# Patient Record
Sex: MALE
[De-identification: names, ages, dates, MRNs, and addresses within clinical notes are randomized per-mention and may not be internally consistent; named-entity substitution may affect disease eponyms.]

---

## 2017-10-06 ENCOUNTER — CHARTING TRANS (OUTPATIENT)
Dept: OTHER | Age: 27
End: 2017-10-06

## 2018-02-21 ENCOUNTER — CHARTING TRANS (OUTPATIENT)
Dept: OTHER | Age: 28
End: 2018-02-21

## 2018-11-01 VITALS
TEMPERATURE: 98.7 F | OXYGEN SATURATION: 95 % | DIASTOLIC BLOOD PRESSURE: 78 MMHG | RESPIRATION RATE: 20 BRPM | SYSTOLIC BLOOD PRESSURE: 120 MMHG | HEART RATE: 104 BPM | BODY MASS INDEX: 35.07 KG/M2 | HEIGHT: 70 IN | WEIGHT: 245 LBS

## 2018-11-02 VITALS
RESPIRATION RATE: 20 BRPM | TEMPERATURE: 99.4 F | DIASTOLIC BLOOD PRESSURE: 72 MMHG | SYSTOLIC BLOOD PRESSURE: 108 MMHG | OXYGEN SATURATION: 98 % | HEIGHT: 70 IN | HEART RATE: 108 BPM | WEIGHT: 250 LBS | BODY MASS INDEX: 35.79 KG/M2

## 2019-10-03 ENCOUNTER — WALK IN (OUTPATIENT)
Dept: URGENT CARE | Age: 29
End: 2019-10-03

## 2019-10-03 VITALS
TEMPERATURE: 99.4 F | DIASTOLIC BLOOD PRESSURE: 80 MMHG | SYSTOLIC BLOOD PRESSURE: 118 MMHG | HEIGHT: 70 IN | BODY MASS INDEX: 36.51 KG/M2 | HEART RATE: 84 BPM | WEIGHT: 255 LBS | RESPIRATION RATE: 18 BRPM

## 2019-10-03 DIAGNOSIS — B34.9 PHARYNGITIS WITH VIRAL SYNDROME: Primary | ICD-10-CM

## 2019-10-03 DIAGNOSIS — J02.9 PHARYNGITIS WITH VIRAL SYNDROME: Primary | ICD-10-CM

## 2019-10-03 PROCEDURE — 99213 OFFICE O/P EST LOW 20 MIN: CPT | Performed by: NURSE PRACTITIONER

## 2019-10-03 RX ORDER — ALBUTEROL SULFATE 90 UG/1
AEROSOL, METERED RESPIRATORY (INHALATION)
COMMUNITY
End: 2022-09-30 | Stop reason: SDUPTHER

## 2019-10-03 RX ORDER — PAROXETINE HYDROCHLORIDE 40 MG/1
TABLET, FILM COATED ORAL
COMMUNITY

## 2019-10-03 RX ORDER — ALBUTEROL SULFATE 90 UG/1
2 AEROSOL, METERED RESPIRATORY (INHALATION)
COMMUNITY
Start: 2017-12-17 | End: 2022-09-30 | Stop reason: SDUPTHER

## 2019-10-03 RX ORDER — ALPRAZOLAM 0.5 MG/1
0.5 TABLET ORAL
COMMUNITY

## 2019-10-03 ASSESSMENT — ENCOUNTER SYMPTOMS
FATIGUE: 1
RESPIRATORY NEGATIVE: 1
SORE THROAT: 1
GASTROINTESTINAL NEGATIVE: 1
EYES NEGATIVE: 1
ACTIVITY CHANGE: 1

## 2020-01-30 ENCOUNTER — WALK IN (OUTPATIENT)
Dept: URGENT CARE | Age: 30
End: 2020-01-30

## 2020-01-30 VITALS
WEIGHT: 254.52 LBS | DIASTOLIC BLOOD PRESSURE: 78 MMHG | SYSTOLIC BLOOD PRESSURE: 120 MMHG | HEART RATE: 78 BPM | BODY MASS INDEX: 36.44 KG/M2 | RESPIRATION RATE: 16 BRPM | TEMPERATURE: 98.8 F | HEIGHT: 70 IN

## 2020-01-30 DIAGNOSIS — J10.1 INFLUENZA B: Primary | ICD-10-CM

## 2020-01-30 DIAGNOSIS — J02.9 PHARYNGITIS, UNSPECIFIED ETIOLOGY: ICD-10-CM

## 2020-01-30 LAB
FLUAV AG UPPER RESP QL IA.RAPID: NEGATIVE
FLUBV AG UPPER RESP QL IA.RAPID: POSITIVE
INTERNAL PROCEDURAL CONTROLS ACCEPTABLE: YES
S PYO AG THROAT QL IA.RAPID: NEGATIVE

## 2020-01-30 PROCEDURE — 87804 INFLUENZA ASSAY W/OPTIC: CPT | Performed by: NURSE PRACTITIONER

## 2020-01-30 PROCEDURE — 87880 STREP A ASSAY W/OPTIC: CPT | Performed by: NURSE PRACTITIONER

## 2020-01-30 PROCEDURE — 99214 OFFICE O/P EST MOD 30 MIN: CPT | Performed by: NURSE PRACTITIONER

## 2020-01-30 RX ORDER — OSELTAMIVIR PHOSPHATE 75 MG/1
75 CAPSULE ORAL 2 TIMES DAILY
Qty: 10 CAPSULE | Refills: 0 | Status: SHIPPED | OUTPATIENT
Start: 2020-01-30 | End: 2020-02-04

## 2020-01-30 RX ORDER — PAROXETINE 30 MG/1
TABLET, FILM COATED ORAL
Refills: 2 | COMMUNITY
Start: 2020-01-14 | End: 2022-09-30 | Stop reason: SDUPTHER

## 2020-01-30 RX ORDER — ALBUTEROL SULFATE 90 UG/1
2 AEROSOL, METERED RESPIRATORY (INHALATION)
COMMUNITY
Start: 2019-11-12

## 2020-01-30 RX ORDER — PAROXETINE HYDROCHLORIDE 20 MG/1
TABLET, FILM COATED ORAL
Refills: 2 | COMMUNITY
Start: 2019-11-04 | End: 2022-09-30 | Stop reason: SDUPTHER

## 2020-01-30 ASSESSMENT — ENCOUNTER SYMPTOMS
EYE PAIN: 0
SORE THROAT: 1
BACK PAIN: 0
PHOTOPHOBIA: 0
SINUS PRESSURE: 1
EYE REDNESS: 0
ABDOMINAL PAIN: 0
TROUBLE SWALLOWING: 0
SHORTNESS OF BREATH: 0
CHOKING: 0
APNEA: 0
CHILLS: 1
EYE ITCHING: 0
COLOR CHANGE: 0
LIGHT-HEADEDNESS: 0
FEVER: 1
CHEST TIGHTNESS: 0
WHEEZING: 0
NAUSEA: 0
FACIAL SWELLING: 0
SINUS PAIN: 1
EYE DISCHARGE: 0
FATIGUE: 1
DIARRHEA: 0
DIZZINESS: 0
WEAKNESS: 0
VOMITING: 0
RHINORRHEA: 1
HEADACHES: 1
COUGH: 1
STRIDOR: 0
APPETITE CHANGE: 0

## 2022-09-30 ENCOUNTER — WALK IN (OUTPATIENT)
Dept: URGENT CARE | Age: 32
End: 2022-09-30

## 2022-09-30 VITALS
DIASTOLIC BLOOD PRESSURE: 80 MMHG | BODY MASS INDEX: 32.93 KG/M2 | HEIGHT: 70 IN | RESPIRATION RATE: 20 BRPM | WEIGHT: 230 LBS | OXYGEN SATURATION: 100 % | HEART RATE: 90 BPM | SYSTOLIC BLOOD PRESSURE: 122 MMHG | TEMPERATURE: 98.6 F

## 2022-09-30 DIAGNOSIS — J06.9 UPPER RESPIRATORY TRACT INFECTION, UNSPECIFIED TYPE: Primary | ICD-10-CM

## 2022-09-30 PROBLEM — R10.9 FLANK PAIN: Status: ACTIVE | Noted: 2022-06-02

## 2022-09-30 PROBLEM — F41.9 ANXIETY: Status: ACTIVE | Noted: 2022-09-30

## 2022-09-30 PROBLEM — J45.909 ASTHMA: Status: ACTIVE | Noted: 2022-06-02

## 2022-09-30 LAB
FLUAV AG UPPER RESP QL IA.RAPID: NEGATIVE
FLUBV AG UPPER RESP QL IA.RAPID: NEGATIVE
INTERNAL PROCEDURAL CONTROLS ACCEPTABLE: YES
S PYO AG THROAT QL IA.RAPID: NEGATIVE
SARS-COV+SARS-COV-2 AG RESP QL IA.RAPID: NOT DETECTED
TEST LOT EXPIRATION DATE: NORMAL
TEST LOT EXPIRATION DATE: NORMAL
TEST LOT NUMBER: NORMAL
TEST LOT NUMBER: NORMAL

## 2022-09-30 PROCEDURE — 87880 STREP A ASSAY W/OPTIC: CPT | Performed by: NURSE PRACTITIONER

## 2022-09-30 PROCEDURE — 99213 OFFICE O/P EST LOW 20 MIN: CPT | Performed by: NURSE PRACTITIONER

## 2022-09-30 PROCEDURE — U0003 INFECTIOUS AGENT DETECTION BY NUCLEIC ACID (DNA OR RNA); SEVERE ACUTE RESPIRATORY SYNDROME CORONAVIRUS 2 (SARS-COV-2) (CORONAVIRUS DISEASE [COVID-19]), AMPLIFIED PROBE TECHNIQUE, MAKING USE OF HIGH THROUGHPUT TECHNOLOGIES AS DESCRIBED BY CMS-2020-01-R: HCPCS | Performed by: INTERNAL MEDICINE

## 2022-09-30 PROCEDURE — U0005 INFEC AGEN DETEC AMPLI PROBE: HCPCS | Performed by: INTERNAL MEDICINE

## 2022-09-30 PROCEDURE — 87428 SARSCOV & INF VIR A&B AG IA: CPT | Performed by: NURSE PRACTITIONER

## 2022-09-30 RX ORDER — FLUTICASONE PROPIONATE 50 MCG
SPRAY, SUSPENSION (ML) NASAL
COMMUNITY
Start: 2022-09-30

## 2022-10-01 LAB
SARS-COV-2 RNA RESP QL NAA+PROBE: NOT DETECTED
SERVICE CMNT-IMP: NORMAL
SERVICE CMNT-IMP: NORMAL

## 2022-12-15 ENCOUNTER — WALK IN (OUTPATIENT)
Dept: URGENT CARE | Age: 32
End: 2022-12-15

## 2022-12-15 VITALS
WEIGHT: 230 LBS | RESPIRATION RATE: 18 BRPM | OXYGEN SATURATION: 100 % | DIASTOLIC BLOOD PRESSURE: 78 MMHG | HEIGHT: 70 IN | TEMPERATURE: 97 F | HEART RATE: 77 BPM | BODY MASS INDEX: 32.93 KG/M2 | SYSTOLIC BLOOD PRESSURE: 110 MMHG

## 2022-12-15 DIAGNOSIS — J11.1 INFLUENZA-LIKE ILLNESS: Primary | ICD-10-CM

## 2022-12-15 DIAGNOSIS — H66.92 LEFT OTITIS MEDIA, UNSPECIFIED OTITIS MEDIA TYPE: ICD-10-CM

## 2022-12-15 PROCEDURE — 99213 OFFICE O/P EST LOW 20 MIN: CPT | Performed by: NURSE PRACTITIONER

## 2022-12-15 PROCEDURE — 87428 SARSCOV & INF VIR A&B AG IA: CPT | Performed by: NURSE PRACTITIONER

## 2022-12-15 PROCEDURE — 87880 STREP A ASSAY W/OPTIC: CPT | Performed by: NURSE PRACTITIONER

## 2022-12-15 RX ORDER — AMOXICILLIN 875 MG/1
875 TABLET, COATED ORAL 2 TIMES DAILY
Qty: 14 TABLET | Refills: 0 | Status: SHIPPED | OUTPATIENT
Start: 2022-12-15 | End: 2022-12-22

## 2022-12-15 ASSESSMENT — ENCOUNTER SYMPTOMS
VOMITING: 0
EYE REDNESS: 0
FEVER: 1
CHEST TIGHTNESS: 0
SHORTNESS OF BREATH: 0
PHOTOPHOBIA: 0
EYE ITCHING: 0
WEAKNESS: 0
DIARRHEA: 0
ABDOMINAL PAIN: 0
NAUSEA: 0
RHINORRHEA: 0
DIAPHORESIS: 0
TROUBLE SWALLOWING: 0
SORE THROAT: 1
APPETITE CHANGE: 1
FATIGUE: 0
SINUS PRESSURE: 0
WHEEZING: 1
SINUS PAIN: 0
DIZZINESS: 0
COLOR CHANGE: 0
ACTIVITY CHANGE: 0
CHILLS: 1
COUGH: 1
EYE PAIN: 0
LIGHT-HEADEDNESS: 0
HEADACHES: 1
EYE DISCHARGE: 0

## 2023-04-26 ENCOUNTER — OFFICE VISIT (OUTPATIENT)
Dept: FAMILY MEDICINE CLINIC | Facility: CLINIC | Age: 33
End: 2023-04-26

## 2023-04-26 VITALS
DIASTOLIC BLOOD PRESSURE: 83 MMHG | HEART RATE: 86 BPM | HEIGHT: 70 IN | BODY MASS INDEX: 34.5 KG/M2 | SYSTOLIC BLOOD PRESSURE: 121 MMHG | WEIGHT: 241 LBS

## 2023-04-26 DIAGNOSIS — N20.0 RIGHT NEPHROLITHIASIS: ICD-10-CM

## 2023-04-26 DIAGNOSIS — S39.011A STRAIN OF MUSCLE OF RIGHT GROIN REGION: ICD-10-CM

## 2023-04-26 DIAGNOSIS — R93.5 ABNORMAL ABDOMINAL CT SCAN: Primary | ICD-10-CM

## 2023-04-26 PROCEDURE — 99203 OFFICE O/P NEW LOW 30 MIN: CPT | Performed by: NURSE PRACTITIONER

## 2023-04-26 PROCEDURE — 3008F BODY MASS INDEX DOCD: CPT | Performed by: NURSE PRACTITIONER

## 2023-04-26 PROCEDURE — 3074F SYST BP LT 130 MM HG: CPT | Performed by: NURSE PRACTITIONER

## 2023-04-26 PROCEDURE — 3079F DIAST BP 80-89 MM HG: CPT | Performed by: NURSE PRACTITIONER

## 2023-04-26 RX ORDER — ALPRAZOLAM 0.5 MG/1
1 TABLET ORAL NIGHTLY PRN
COMMUNITY
Start: 2021-12-07

## 2023-04-26 RX ORDER — PAROXETINE HYDROCHLORIDE 40 MG/1
40 TABLET, FILM COATED ORAL EVERY MORNING
COMMUNITY
Start: 2021-12-07

## 2023-04-26 RX ORDER — ALBUTEROL SULFATE 90 UG/1
2 AEROSOL, METERED RESPIRATORY (INHALATION) EVERY 6 HOURS PRN
COMMUNITY
Start: 2022-06-02

## 2023-04-26 NOTE — ASSESSMENT & PLAN NOTE
Will do mri enterography abd and pelvis   Discussed symptoms of bowel obstruction and pt denies all   Discussed w pt red flag symptoms that if they occur, pt should immediately go to ER. Pt states understanding.

## 2023-04-26 NOTE — ASSESSMENT & PLAN NOTE
Ice 20 min 4-6 times per day  Do not use heat on injury  Do not apply ice directly on skin, make certain a thin cloth is between skin and ice pack    May use ibuprofen 600 mg po three times per day as needed w food  Please call if symptoms worsen or are not resolving. 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

## 2023-06-23 ENCOUNTER — HOSPITAL ENCOUNTER (OUTPATIENT)
Dept: MRI IMAGING | Facility: HOSPITAL | Age: 33
Discharge: HOME | End: 2023-06-23
Attending: NURSE PRACTITIONER
Payer: COMMERCIAL

## 2023-06-23 DIAGNOSIS — R93.5 ABNORMAL ABDOMINAL CT SCAN: ICD-10-CM

## 2023-06-23 PROCEDURE — 74183 MRI ABD W/O CNTR FLWD CNTR: CPT | Performed by: NURSE PRACTITIONER

## 2023-06-23 PROCEDURE — 72197 MRI PELVIS W/O & W/DYE: CPT | Performed by: NURSE PRACTITIONER

## 2023-06-23 PROCEDURE — A9575 INJ GADOTERATE MEGLUMI 0.1ML: HCPCS | Performed by: NURSE PRACTITIONER

## 2023-06-23 RX ORDER — GADOTERATE MEGLUMINE 376.9 MG/ML
20 INJECTION INTRAVENOUS
Status: COMPLETED | OUTPATIENT
Start: 2023-06-23 | End: 2023-06-23

## 2023-06-23 RX ADMIN — GADOTERATE MEGLUMINE 20 ML: 376.9 INJECTION INTRAVENOUS at 10:58:00

## 2023-09-05 ENCOUNTER — V-VISIT (OUTPATIENT)
Dept: URGENT CARE | Age: 33
End: 2023-09-05

## 2023-09-05 VITALS
RESPIRATION RATE: 18 BRPM | DIASTOLIC BLOOD PRESSURE: 86 MMHG | HEIGHT: 70 IN | BODY MASS INDEX: 33.64 KG/M2 | TEMPERATURE: 97.5 F | SYSTOLIC BLOOD PRESSURE: 124 MMHG | OXYGEN SATURATION: 98 % | HEART RATE: 77 BPM | WEIGHT: 235 LBS

## 2023-09-05 DIAGNOSIS — H65.00 ACUTE SEROUS OTITIS MEDIA, RECURRENCE NOT SPECIFIED, UNSPECIFIED LATERALITY: Primary | ICD-10-CM

## 2023-09-05 PROCEDURE — 99213 OFFICE O/P EST LOW 20 MIN: CPT | Performed by: NURSE PRACTITIONER

## 2023-09-05 RX ORDER — AMOXICILLIN AND CLAVULANATE POTASSIUM 875; 125 MG/1; MG/1
1 TABLET, FILM COATED ORAL 2 TIMES DAILY
Qty: 14 TABLET | Refills: 0 | Status: SHIPPED | OUTPATIENT
Start: 2023-09-05 | End: 2023-09-12

## 2023-09-05 ASSESSMENT — ENCOUNTER SYMPTOMS
DIZZINESS: 0
SINUS PRESSURE: 0
SORE THROAT: 0
FEVER: 0
DIAPHORESIS: 0
RESPIRATORY NEGATIVE: 1

## 2023-09-05 ASSESSMENT — PAIN SCALES - GENERAL: PAINLEVEL: 6

## 2024-11-17 ENCOUNTER — HOSPITAL ENCOUNTER (INPATIENT)
Facility: HOSPITAL | Age: 34
LOS: 1 days | Discharge: HOME | End: 2024-11-18
Attending: EMERGENCY MEDICINE | Admitting: INTERNAL MEDICINE
Payer: COMMERCIAL

## 2024-11-17 ENCOUNTER — HOSPITAL ENCOUNTER (OUTPATIENT)
Age: 34
Discharge: ANOTHER HOSPITAL | End: 2024-11-17
Payer: COMMERCIAL

## 2024-11-17 VITALS
DIASTOLIC BLOOD PRESSURE: 68 MMHG | OXYGEN SATURATION: 99 % | SYSTOLIC BLOOD PRESSURE: 126 MMHG | RESPIRATION RATE: 18 BRPM | HEART RATE: 72 BPM | TEMPERATURE: 98 F

## 2024-11-17 DIAGNOSIS — L03.116 CELLULITIS OF LEFT LOWER EXTREMITY: Primary | ICD-10-CM

## 2024-11-17 DIAGNOSIS — T14.8XXA INFECTED PUNCTURE WOUND: Primary | ICD-10-CM

## 2024-11-17 DIAGNOSIS — L08.9 INFECTED PUNCTURE WOUND: Primary | ICD-10-CM

## 2024-11-17 DIAGNOSIS — L03.119 CELLULITIS OF FOOT: ICD-10-CM

## 2024-11-17 DIAGNOSIS — L03.129 LYMPHANGITIS, ACUTE, LOWER LEG: ICD-10-CM

## 2024-11-17 LAB
ALBUMIN SERPL-MCNC: 4.3 G/DL (ref 3.2–4.8)
ALBUMIN/GLOB SERPL: 1.3 {RATIO} (ref 1–2)
ALP LIVER SERPL-CCNC: 66 U/L
ALT SERPL-CCNC: 20 U/L
ANION GAP SERPL CALC-SCNC: 3 MMOL/L (ref 0–18)
AST SERPL-CCNC: 14 U/L (ref ?–34)
BASOPHILS # BLD AUTO: 0.04 X10(3) UL (ref 0–0.2)
BASOPHILS NFR BLD AUTO: 0.5 %
BILIRUB SERPL-MCNC: 0.6 MG/DL (ref 0.3–1.2)
BUN BLD-MCNC: 19 MG/DL (ref 9–23)
CALCIUM BLD-MCNC: 9.6 MG/DL (ref 8.7–10.4)
CHLORIDE SERPL-SCNC: 106 MMOL/L (ref 98–112)
CO2 SERPL-SCNC: 27 MMOL/L (ref 21–32)
CREAT BLD-MCNC: 1.08 MG/DL
EGFRCR SERPLBLD CKD-EPI 2021: 92 ML/MIN/1.73M2 (ref 60–?)
EOSINOPHIL # BLD AUTO: 0.26 X10(3) UL (ref 0–0.7)
EOSINOPHIL NFR BLD AUTO: 3.3 %
ERYTHROCYTE [DISTWIDTH] IN BLOOD BY AUTOMATED COUNT: 12.7 %
GLOBULIN PLAS-MCNC: 3.2 G/DL (ref 2–3.5)
GLUCOSE BLD-MCNC: 93 MG/DL (ref 70–99)
HCT VFR BLD AUTO: 44.5 %
HGB BLD-MCNC: 15.3 G/DL
IMM GRANULOCYTES # BLD AUTO: 0.01 X10(3) UL (ref 0–1)
IMM GRANULOCYTES NFR BLD: 0.1 %
LYMPHOCYTES # BLD AUTO: 2.19 X10(3) UL (ref 1–4)
LYMPHOCYTES NFR BLD AUTO: 27.4 %
MCH RBC QN AUTO: 28.1 PG (ref 26–34)
MCHC RBC AUTO-ENTMCNC: 34.4 G/DL (ref 31–37)
MCV RBC AUTO: 81.7 FL
MONOCYTES # BLD AUTO: 0.75 X10(3) UL (ref 0.1–1)
MONOCYTES NFR BLD AUTO: 9.4 %
NEUTROPHILS # BLD AUTO: 4.75 X10 (3) UL (ref 1.5–7.7)
NEUTROPHILS # BLD AUTO: 4.75 X10(3) UL (ref 1.5–7.7)
NEUTROPHILS NFR BLD AUTO: 59.3 %
OSMOLALITY SERPL CALC.SUM OF ELEC: 284 MOSM/KG (ref 275–295)
PLATELET # BLD AUTO: 282 10(3)UL (ref 150–450)
POTASSIUM SERPL-SCNC: 3.9 MMOL/L (ref 3.5–5.1)
PROT SERPL-MCNC: 7.5 G/DL (ref 5.7–8.2)
RBC # BLD AUTO: 5.45 X10(6)UL
SODIUM SERPL-SCNC: 136 MMOL/L (ref 136–145)
WBC # BLD AUTO: 8 X10(3) UL (ref 4–11)

## 2024-11-17 PROCEDURE — 99223 1ST HOSP IP/OBS HIGH 75: CPT | Performed by: INTERNAL MEDICINE

## 2024-11-17 RX ORDER — BISACODYL 10 MG
10 SUPPOSITORY, RECTAL RECTAL
Status: DISCONTINUED | OUTPATIENT
Start: 2024-11-17 | End: 2024-11-18

## 2024-11-17 RX ORDER — ALPRAZOLAM 0.5 MG
0.5 TABLET ORAL NIGHTLY PRN
Status: DISCONTINUED | OUTPATIENT
Start: 2024-11-17 | End: 2024-11-18

## 2024-11-17 RX ORDER — SODIUM PHOSPHATE, DIBASIC AND SODIUM PHOSPHATE, MONOBASIC 7; 19 G/230ML; G/230ML
1 ENEMA RECTAL ONCE AS NEEDED
Status: DISCONTINUED | OUTPATIENT
Start: 2024-11-17 | End: 2024-11-18

## 2024-11-17 RX ORDER — ACETAMINOPHEN 500 MG
500 TABLET ORAL EVERY 4 HOURS PRN
Status: DISCONTINUED | OUTPATIENT
Start: 2024-11-17 | End: 2024-11-18

## 2024-11-17 RX ORDER — POLYETHYLENE GLYCOL 3350 17 G/17G
17 POWDER, FOR SOLUTION ORAL DAILY PRN
Status: DISCONTINUED | OUTPATIENT
Start: 2024-11-17 | End: 2024-11-18

## 2024-11-17 RX ORDER — KETOROLAC TROMETHAMINE 30 MG/ML
15 INJECTION, SOLUTION INTRAMUSCULAR; INTRAVENOUS EVERY 6 HOURS PRN
Status: DISCONTINUED | OUTPATIENT
Start: 2024-11-17 | End: 2024-11-18

## 2024-11-17 RX ORDER — ALBUTEROL SULFATE 90 UG/1
2 INHALANT RESPIRATORY (INHALATION) EVERY 6 HOURS PRN
Status: DISCONTINUED | OUTPATIENT
Start: 2024-11-17 | End: 2024-11-18

## 2024-11-17 RX ORDER — PROCHLORPERAZINE EDISYLATE 5 MG/ML
5 INJECTION INTRAMUSCULAR; INTRAVENOUS EVERY 8 HOURS PRN
Status: DISCONTINUED | OUTPATIENT
Start: 2024-11-17 | End: 2024-11-18

## 2024-11-17 RX ORDER — PAROXETINE 20 MG/1
40 TABLET, FILM COATED ORAL EVERY MORNING
Status: DISCONTINUED | OUTPATIENT
Start: 2024-11-18 | End: 2024-11-18

## 2024-11-17 RX ORDER — HEPARIN SODIUM 5000 [USP'U]/ML
5000 INJECTION, SOLUTION INTRAVENOUS; SUBCUTANEOUS EVERY 8 HOURS SCHEDULED
Status: DISCONTINUED | OUTPATIENT
Start: 2024-11-17 | End: 2024-11-18

## 2024-11-17 RX ORDER — ONDANSETRON 2 MG/ML
4 INJECTION INTRAMUSCULAR; INTRAVENOUS EVERY 6 HOURS PRN
Status: DISCONTINUED | OUTPATIENT
Start: 2024-11-17 | End: 2024-11-18

## 2024-11-17 RX ORDER — SENNOSIDES 8.6 MG
17.2 TABLET ORAL NIGHTLY PRN
Status: DISCONTINUED | OUTPATIENT
Start: 2024-11-17 | End: 2024-11-18

## 2024-11-17 RX ORDER — KETOROLAC TROMETHAMINE 30 MG/ML
30 INJECTION, SOLUTION INTRAMUSCULAR; INTRAVENOUS EVERY 6 HOURS PRN
Status: DISCONTINUED | OUTPATIENT
Start: 2024-11-17 | End: 2024-11-18

## 2024-11-17 NOTE — ED INITIAL ASSESSMENT (HPI)
Pt states he got a nail in his left foot 2 days ago. Pt states having increased pain and swelling. Went to [facility] to get his TDAP shot and then went to . Per call in, sent for IV ABX.

## 2024-11-17 NOTE — PLAN OF CARE
NURSING ADMISSION NOTE      Patient admitted via Cart from ED.   Oriented to room.  Safety precautions initiated.  Bed in low position.  Call light in reach. Denies pain at this time.

## 2024-11-17 NOTE — ED QUICK NOTES
Orders for admission, patient is aware of plan and ready to go upstairs. Any questions, please call ED RN Yessi ESTRADA at extension [phone].     Patient Covid vaccination status: Fully vaccinated     COVID Test Ordered in ED: None    COVID Suspicion at Admission: N/A    Running Infusions:      Mental Status/LOC at time of transport: A&Ox4    Other pertinent information:   CIWA score: N/A   NIH score:  N/A

## 2024-11-17 NOTE — ED PROVIDER NOTES
Patient Seen in: [facility] Emergency Department      History     Chief Complaint   Patient presents with    Other     Stated Complaint: from urgent care for IV antibiotics    Subjective:   HPI      33-year-old male with no significant past medical history presents with left foot pain and swelling after he stepped on a nail 2 days ago.  Patient states the nail went through his work boot and punctured the bottom of his foot.  He states he went to [facility] got a tetanus shot but was not seen by provider.  He states pain has been gradually worsening over the last couple of days and went to immediate care and he was sent to ED for further evaluation after they noted red streaking on the dorsum of the foot.  He denies fever or chills.  Denies nausea or vomiting.    Objective:     History reviewed. No pertinent past medical history.           Past Surgical History:   Procedure Laterality Date    Tonsillectomy      2018                Social History     Socioeconomic History    Marital status:    Tobacco Use    Smoking status: Never    Smokeless tobacco: Never   Vaping Use    Vaping status: Never Used   Substance and Sexual Activity    Alcohol use: Yes     Comment: Occ.    Drug use: Never                  Physical Exam     ED Triage Vitals [11/17/24 1406]   /72   Pulse 74   Resp 17   Temp 98 °F (36.7 °C)   Temp src Temporal   SpO2 99 %   O2 Device None (Room air)       Current Vitals:   Vital Signs  BP: 118/54  Pulse: 56  Resp: 17  Temp: 98 °F (36.7 °C)  Temp src: Temporal  MAP (mmHg): 70    Oxygen Therapy  SpO2: 100 %  O2 Device: None (Room air)        Physical Exam  Vitals and nursing note reviewed.   Constitutional:       General: He is not in acute distress.     Appearance: He is well-developed. He is not ill-appearing.   HENT:      Head: Normocephalic and atraumatic.      Mouth/Throat:      Mouth: Mucous membranes are moist.   Eyes:      General: No scleral icterus.     Extraocular Movements:  Extraocular movements intact.   Musculoskeletal:      Cervical back: Neck supple.      Comments: Puncture wound to the sole of the left foot with tenderness, no significant surrounding erythema or drainage  Dorsum of left foot with tenderness, edema and erythema extending from forefoot streaking up to the ankle     Skin:     General: Skin is warm and dry.      Capillary Refill: Capillary refill takes less than 2 seconds.   Neurological:      Mental Status: He is alert and oriented to person, place, and time.      GCS: GCS eye subscore is 4. GCS verbal subscore is 5. GCS motor subscore is 6.   Psychiatric:         Mood and Affect: Mood normal.         Behavior: Behavior normal.            ED Course     Labs Reviewed   COMP METABOLIC PANEL (14) - Normal   CBC WITH DIFFERENTIAL WITH PLATELET   BLOOD CULTURE   BLOOD CULTURE                   MDM      33-year-old male with no significant past medical history presents with left foot pain and swelling after he stepped on a nail 2 days ago.      Differential includes but is not limited to infected puncture wound, cellulitis, lymphangitis    Labs unremarkable with normal WBC.    Discussed with ID Dr. London.  Recommends admission for IV antibiotics/Zosyn which will provide pseudomonal and anaerobic coverage.  Discussed with hospitalist Dr. Beasley.        Admission disposition: 11/17/2024  3:23 PM           Medical Decision Making  Amount and/or Complexity of Data Reviewed  Labs: ordered. Decision-making details documented in ED Course.  Discussion of management or test interpretation with external provider(s): ID, hospitalist    Risk  Decision regarding hospitalization.        Disposition and Plan     Clinical Impression:  1. Infected puncture wound    2. Cellulitis of foot         Disposition:  Admit  11/17/2024  3:23 pm    Follow-up:  No follow-up provider specified.        Medications Prescribed:  Current Discharge Medication List              Supplementary  Documentation:         Hospital Problems       Present on Admission  Date Reviewed: 4/26/2023            ICD-10-CM Noted POA    * (Principal) Infected puncture wound T14.8XXA, L08.9 11/17/2024 Unknown

## 2024-11-17 NOTE — H&P
[facility]     Maxx Ray Patient Status:  Emergency    12/15/1990 MRN [id number]   Location [facility] EMERGENCY DEPARTMENT Attending Gladys Daniel MD   Hosp Day # 0 PCP None Pcp     Chief Complaint: nail to L foot    Subjective:    History of Present Illness:     Maxx Ray is a 33 year old male presents with L foot wound. Pt stepped on nail with his L foot 2 days ago. He got tetnus shot at [facility] as well. He has continued redness, swelling and pain to his L foot. He denies any F/C. No CP or SOB. No N/V/D/C or abdpain.     History/Other:    Past Medical History:  History reviewed. No pertinent past medical history.  Past Surgical History:   Past Surgical History:   Procedure Laterality Date    Tonsillectomy      2018      Family History:   No family history on file.  Social History:    reports that he has never smoked. He has never used smokeless tobacco. He reports current alcohol use. He reports that he does not use drugs.     Allergies: Allergies[1]    Medications:  Medications Ordered Prior to Encounter[2]    Review of Systems:   A comprehensive review of systems was completed.    Pertinent positives and negatives noted in the HPI.    Objective:   Physical Exam:    /54   Pulse 56   Temp 98 °F (36.7 °C) (Temporal)   Resp 17   Ht 177.8 cm (5' 10\")   Wt 235 lb (106.6 kg)   SpO2 100%   BMI 33.72 kg/m²   General: No acute distress, Alert  Respiratory: No rhonchi, no wheezes  Cardiovascular: S1, S2. Regular rate and rhythm  Abdomen: Soft, Non-tender, non-distended, positive bowel sounds  Neuro: No new focal deficits  Extremities: puncture wound to L foot with tenderness and erythema streaking to foot/ankle    Results:    Labs:      Labs Last 24 Hours:    Recent Labs   Lab 24  1429   RBC 5.45   HGB 15.3   HCT 44.5   MCV 81.7   MCH 28.1   MCHC 34.4   RDW 12.7   NEPRELIM 4.75   WBC 8.0   .0       Recent Labs   Lab 24  1429   GLU 93   BUN  19   CREATSERUM 1.08   EGFRCR 92   CA 9.6   ALB 4.3      K 3.9      CO2 27.0   ALKPHO 66   AST 14   ALT 20   BILT 0.6   TP 7.5       No results found for: \"PT\", \"INR\"    No results for input(s): \"TROP\", \"TROPHS\", \"CK\" in the last 168 hours.    No results for input(s): \"TROP\", \"PBNP\" in the last 168 hours.    No results for input(s): \"PCT\" in the last 168 hours.    Imaging: Imaging data reviewed in Epic.    Assessment & Plan:      #Infected Puncture Wound  #L foot cellulitis  Continue empiric abx, hopefully can transition to PO tmrw  Follow cultures  ID to evaluate        Plan of care discussed with patient, ED Physician     Juan Beasley MD    Supplementary Documentation:     The 21st Century Cures Act makes medical notes like these available to patients in the interest of transparency. Please be advised this is a medical document. Medical documents are intended to carry relevant information, facts as evident, and the clinical opinion of the practitioner. The medical note is intended as peer to peer communication and may appear blunt or direct. It is written in medical language and may contain abbreviations or verbiage that are unfamiliar.                                       [1] No Known Allergies  [2]   No current facility-administered medications on file prior to encounter.     Current Outpatient Medications on File Prior to Encounter   Medication Sig Dispense Refill    albuterol 108 (90 Base) MCG/ACT Inhalation Aero Soln Inhale 2 puffs into the lungs every 6 (six) hours as needed.      ALPRAZolam 0.5 MG Oral Tab Take 1 tablet (0.5 mg total) by mouth nightly as needed.      PARoxetine HCl 40 MG Oral Tab Take 1 tablet (40 mg total) by mouth every morning.

## 2024-11-17 NOTE — ED PROVIDER NOTES
Chief Complaint   Patient presents with    Foot Pain       HPI:     Maxx Ray is a 33 year old male who presents today with a chief complaint of left foot pain.  2 days ago, was working, wearing working boots, stepped on nail.  Went to [facility] for tetanus shot, was not evaluated.  Has not been on any antibiotics for this.  Denies fever, sweats, chills.    PFSH      PFSH asessment screens reviewed and agree.  Nurses notes reviewed I agree with documentation.    No family history on file.  Family history reviewed with patient/caregiver and is not pertinent to presenting problem.  Social History     Socioeconomic History    Marital status:      Spouse name: Not on file    Number of children: Not on file    Years of education: Not on file    Highest education level: Not on file   Occupational History    Not on file   Tobacco Use    Smoking status: Never    Smokeless tobacco: Never   Vaping Use    Vaping status: Never Used   Substance and Sexual Activity    Alcohol use: Yes     Comment: Occ.    Drug use: Never    Sexual activity: Not on file   Other Topics Concern    Not on file   Social History Narrative    Not on file     Social Drivers of Health     Financial Resource Strain: Not on file   Food Insecurity: Not on file   Transportation Needs: Not on file   Physical Activity: Not on file   Stress: Not on file   Social Connections: Not on file   Housing Stability: Not on file         ROS:   Positive for stated complaint: nail through boot  All other systems reviewed and negative except as noted above.  Constitutional and Vital Signs Reviewed.      Physical Exam:     Rash:    There is swelling, erythema, tenderness surrounding pinpoint puncture wound to left plantar portion of the foot.  To the dorsal foot there is erythema, and proximal red streaking up the ankle.    Physical Exam:  /68   Pulse 72   Temp 98.1 °F (36.7 °C) (Temporal)   Resp 18   SpO2 99%   GENERAL: well developed, well nourished,  well hydrated, no distress  SKIN: good skin turgor, see above description for rash    MDM/Assessment/Plan:   Orders for this encounter:    No orders of the defined types were placed in this encounter.      Labs performed this visit:  No results found for this or any previous visit (from the past 10 hours).    MDM:  Medical Decision Making  Differentials considered: Cellulitis versus lymphangitis versus retained foreign body versus other    Patient needs further evaluation in the ER, has signs of lymphangitis on exam today.  Patient will go to [facility].  Patient agreeable.    Case discussed with Dr. Jose Resendez-Linden          Diagnosis:    ICD-10-CM    1. Cellulitis of left lower extremity  L03.116       2. Lymphangitis, acute, lower leg  L03.129           All results reviewed and discussed with patient.  See AVS for detailed discharge instructions for your condition today.    Follow Up with:  No follow-up provider specified.

## 2024-11-17 NOTE — ED INITIAL ASSESSMENT (HPI)
Patient reports stepping on nail to left foot two days ago. Patient reports going to [facility] same day for tetanus shot. Patient concerns for continued redness, swelling, and pain to left foot. CMS intact. Streaking noted to left foot. Denies fever.

## 2024-11-18 ENCOUNTER — APPOINTMENT (OUTPATIENT)
Dept: GENERAL RADIOLOGY | Facility: HOSPITAL | Age: 34
End: 2024-11-18
Attending: INTERNAL MEDICINE
Payer: COMMERCIAL

## 2024-11-18 VITALS
BODY MASS INDEX: 33.64 KG/M2 | RESPIRATION RATE: 20 BRPM | OXYGEN SATURATION: 98 % | HEART RATE: 56 BPM | SYSTOLIC BLOOD PRESSURE: 104 MMHG | WEIGHT: 235 LBS | DIASTOLIC BLOOD PRESSURE: 55 MMHG | HEIGHT: 70 IN | TEMPERATURE: 99 F

## 2024-11-18 LAB
BASOPHILS # BLD AUTO: 0.06 X10(3) UL (ref 0–0.2)
BASOPHILS NFR BLD AUTO: 0.8 %
EOSINOPHIL # BLD AUTO: 0.34 X10(3) UL (ref 0–0.7)
EOSINOPHIL NFR BLD AUTO: 4.5 %
ERYTHROCYTE [DISTWIDTH] IN BLOOD BY AUTOMATED COUNT: 12.6 %
HCT VFR BLD AUTO: 43.3 %
HGB BLD-MCNC: 15 G/DL
IMM GRANULOCYTES # BLD AUTO: 0.02 X10(3) UL (ref 0–1)
IMM GRANULOCYTES NFR BLD: 0.3 %
LYMPHOCYTES # BLD AUTO: 3.45 X10(3) UL (ref 1–4)
LYMPHOCYTES NFR BLD AUTO: 46.1 %
MCH RBC QN AUTO: 28.2 PG (ref 26–34)
MCHC RBC AUTO-ENTMCNC: 34.6 G/DL (ref 31–37)
MCV RBC AUTO: 81.4 FL
MONOCYTES # BLD AUTO: 0.71 X10(3) UL (ref 0.1–1)
MONOCYTES NFR BLD AUTO: 9.5 %
NEUTROPHILS # BLD AUTO: 2.9 X10 (3) UL (ref 1.5–7.7)
NEUTROPHILS # BLD AUTO: 2.9 X10(3) UL (ref 1.5–7.7)
NEUTROPHILS NFR BLD AUTO: 38.8 %
PLATELET # BLD AUTO: 263 10(3)UL (ref 150–450)
RBC # BLD AUTO: 5.32 X10(6)UL
WBC # BLD AUTO: 7.5 X10(3) UL (ref 4–11)

## 2024-11-18 PROCEDURE — 73630 X-RAY EXAM OF FOOT: CPT | Performed by: INTERNAL MEDICINE

## 2024-11-18 PROCEDURE — 99239 HOSP IP/OBS DSCHRG MGMT >30: CPT | Performed by: INTERNAL MEDICINE

## 2024-11-18 RX ORDER — LEVOFLOXACIN 750 MG/1
750 TABLET, FILM COATED ORAL DAILY
Qty: 7 TABLET | Refills: 0 | Status: SHIPPED | OUTPATIENT
Start: 2024-11-18 | End: 2024-11-25

## 2024-11-18 NOTE — CONSULTS
INFECTIOUS DISEASE CONSULTATION    Maxx Ray Patient Status:  Inpatient    12/15/1990 MRN [id number]   [facility] 3SW-A Attending Juan Beasley MD   Hosp Day # 1 PCP JANNETH SHERIFF MD       Requested by Dr. Beasley    Reason for Consultation:  Left foot cellulitis    History of Present Illness:  Maxx Ray is a a(n) 33 year old male had stepped on a nail that was sticking through the bottom of an outdoor fence through his shoe on \"Thursday\" .  He came to ED on  with redness at the site and was stared on zosyn overnight.  It is significantly improved. Denies pain or swelling.       History:  History reviewed. No pertinent past medical history.  Past Surgical History:   Procedure Laterality Date    Tonsillectomy      2018     No family history on file.   reports that he has never smoked. He has never used smokeless tobacco. He reports current alcohol use. He reports that he does not use drugs.      Allergies:  Allergies[1]    Medications:    Current Facility-Administered Medications:     ALPRAZolam (Xanax) tab 0.5 mg, 0.5 mg, Oral, Nightly PRN    PARoxetine (Paxil) tab 40 mg, 40 mg, Oral, QAM    albuterol (Ventolin HFA) 108 (90 Base) MCG/ACT inhaler 2 puff, 2 puff, Inhalation, Q6H PRN    piperacillin-tazobactam (Zosyn) 3.375 g in dextrose 5% 100 mL IVPB-ADDV, 3.375 g, Intravenous, Q8H    heparin (Porcine) 5000 UNIT/ML injection 5,000 Units, 5,000 Units, Subcutaneous, Q8H ELLIE    acetaminophen (Tylenol Extra Strength) tab 500 mg, 500 mg, Oral, Q4H PRN    ondansetron (Zofran) 4 MG/2ML injection 4 mg, 4 mg, Intravenous, Q6H PRN    prochlorperazine (Compazine) 10 MG/2ML injection 5 mg, 5 mg, Intravenous, Q8H PRN    polyethylene glycol (PEG 3350) (Miralax) 17 g oral packet 17 g, 17 g, Oral, Daily PRN    sennosides (Senokot) tab 17.2 mg, 17.2 mg, Oral, Nightly PRN    bisacodyl (Dulcolax) 10 MG rectal suppository 10 mg, 10 mg,  Rectal, Daily PRN    fleet enema (Fleet) rectal enema 133 mL, 1 enema, Rectal, Once PRN    ketorolac (Toradol) 30 MG/ML injection 15 mg, 15 mg, Intravenous, Q6H PRN **OR** ketorolac (Toradol) 30 MG/ML injection 30 mg, 30 mg, Intravenous, Q6H PRN  Medications Ordered Prior to Encounter[2]    Review of Systems:    A comprehensive 10 point review of systems was completed.  Pertinent positives and negatives noted in the the HPI.      Physical Exam:    General: No acute distress. Alert and oriented x 3.  Vital signs: Temp:  [97.6 °F (36.4 °C)-98.6 °F (37 °C)] 98.6 °F (37 °C)  Pulse:  [56-74] 56  Resp:  [17-20] 20  BP: (104-126)/(54-72) 104/55  SpO2:  [97 %-100 %] 98 %  Body mass index is 33.72 kg/m².  HEENT: Moist mucous membranes. Extraocular muscles are intact.  Neck: No swelling, no masses  Respiratory: Non labored, symmetric exursion  Cardiovascular: No irregularities in rhythm  Abdomen: Soft, nontender, nondistended.    Musculoskeletal: Full range of motion of all extremities.  No swelling noted.  Joints: no effusions. Puncture site noted, no drainage  Skin: No lesions. No erythema, no open wounds    Laboratory Data:  Laboratory data reviewed      Recent Labs   Lab 11/18/24  0428   RBC 5.32   HGB 15.0   HCT 43.3   MCV 81.4   MCH 28.2   MCHC 34.6   RDW 12.6   NEPRELIM 2.90   WBC 7.5   .0       Recent Labs   Lab 11/17/24  1429   GLU 93   BUN 19   CREATSERUM 1.08   CA 9.6   ALB 4.3      K 3.9      CO2 27.0   ALKPHO 66   AST 14   ALT 20   BILT 0.6   TP 7.5            Established Problem list:  Patient Active Problem List   Diagnosis    Abnormal abdominal CT scan    Right nephrolithiasis    Strain of muscle of right groin region    Infected puncture wound    Cellulitis of foot       ASSESSMENT/PLAN:  1. Nail puncture wound left foot, 2nd MTP area on 11/14  Admitted 11/17 with cellulitis, much improved on zosyn  -can check xray for retained FB  Transition to PO levaquin for discharge          Srikanth CINTRON  MD Mina, MD [facility]  [phone]         [1] No Known Allergies  [2]   No current facility-administered medications on file prior to encounter.     Current Outpatient Medications on File Prior to Encounter   Medication Sig Dispense Refill    albuterol 108 (90 Base) MCG/ACT Inhalation Aero Soln Inhale 2 puffs into the lungs every 6 (six) hours as needed.      ALPRAZolam 0.5 MG Oral Tab Take 1 tablet (0.5 mg total) by mouth nightly as needed.      PARoxetine HCl 40 MG Oral Tab Take 1 tablet (40 mg total) by mouth every morning.

## 2024-11-18 NOTE — PLAN OF CARE
NURSING DISCHARGE NOTE    Discharged Home via Ambulatory.  Accompanied by Spouse  Belongings Taken by patient/family.    AVS printed and discussed, IV removed, Rx for PO ABX E-scribed, reminded to  medicine at pharmacy. Pt ready to discharge home.

## 2024-11-18 NOTE — DISCHARGE SUMMARY
[facility]  DISCHARGE SUMMARY     Maxx Ray Patient Status:  Inpatient    12/15/1990 MRN [id number]   Location [facility] 3SW-A Attending Juan Beasley MD   Hosp Day # 1 PCP JANNETH SHERIFF MD     Date of Admission: 2024  Date of Discharge:   2024      Discharge Disposition: Acute Care Short Term Hospital    Discharge Diagnosis:  L foot Cellulitis  Infected Puncture wound    History of Present Illness: Maxx Ray is a 33 year old male presents with L foot wound. Pt stepped on nail with his L foot 2 days ago. He got tetnus shot at [facility] as well. He has continued redness, swelling and pain to his L foot. He denies any F/C. No CP or SOB. No N/V/D/C or abdpain.     Brief Synopsis: Pt was admitted and responded well to IV abx and was transitioned to oral abx on Dc.     Lace+ Score: 27  59-90 High Risk  29-58 Medium Risk  0-28   Low Risk       TCM Follow-Up Recommendation:  LACE < 29: Low Risk of readmission after discharge from the hospital; Still recommend for TCM follow-up.      Procedures during hospitalization:   none    Incidental or significant findings and recommendations (brief descriptions):  none    Lab/Test results pending at Discharge:   none    Consultants:  ID    Discharge Medication List:     Discharge Medications        START taking these medications        Instructions Prescription details   levoFLOXacin 750 MG Tabs  Commonly known as: Levaquin      Take 1 tablet (750 mg total) by mouth daily for 7 days.   Stop taking on: 2024  Quantity: 7 tablet  Refills: 0            CONTINUE taking these medications        Instructions Prescription details   albuterol 108 (90 Base) MCG/ACT Aers  Commonly known as: Ventolin HFA      Inhale 2 puffs into the lungs every 6 (six) hours as needed.   Refills: 0     ALPRAZolam 0.5 MG Tabs  Commonly known as: Xanax      Take 1 tablet (0.5 mg total) by mouth nightly as needed.   Refills: 0     PARoxetine HCl 40 MG  Tabs  Commonly known as: PAXIL      Take 1 tablet (40 mg total) by mouth every morning.   Refills: 0               Where to Get Your Medications        These medications were sent to [id number] - [place], [place] - [address] AT [facility], [phone], [phone]  [address] [place] [address]      Hours: 24-hours Phone: [phone]   levoFLOXacin 750 MG Tabs         [facility]    Follow-up appointment:   Porfirio Allan MD  [address] [facility] [address] [phone]    Schedule an appointment as soon as possible for a visit      Appointments for Next 30 Days 2024 - 2024      None            Vital signs:           -----------------------------------------------------------------------------------------------  PATIENT DISCHARGE INSTRUCTIONS: See electronic chart    Juan Beasley MD    Total time spent on discharge plannin minutes     The  [place] Cures Act makes medical notes like these available to patients in the interest of transparency. Please be advised this is a medical document. Medical documents are intended to carry relevant information, facts as evident, and the clinical opinion of the practitioner. The medical note is intended as peer to peer communication and may appear blunt or direct. It is written in medical language and may contain abbreviations or verbiage that are unfamiliar.

## 2024-11-18 NOTE — PLAN OF CARE
ED admit puncture wound to Lt foot, Pt is AAOX4, VSS, room air, up ad gamal, voiding freely to restroom, plan for IV ABX, waiting on cultures, Pt doing well, all needs met, all safety measures in place, call light within reach, will CTM.

## 2024-11-18 NOTE — PAYOR COMM NOTE
--------------  ADMISSION REVIEW     Payor: VICKEY MARTIN  Subscriber #:  [id number]  Authorization Number: [id number]    Admit date: 11/17/24  Admit time:  4:12 PM     Patient Seen in: [facility] Emergency Department    History     Stated Complaint: from urgent care for IV antibiotics    33-year-old male with no significant past medical history presents with left foot pain and swelling after he stepped on a nail 2 days ago.  Patient states the nail went through his work boot and punctured the bottom of his foot.  He states he went to [facility] got a tetanus shot but was not seen by provider.  He states pain has been gradually worsening over the last couple of days and went to immediate care and he was sent to ED for further evaluation after they noted red streaking on the dorsum of the foot.  He denies fever or chills.  Denies nausea or vomiting.    History reviewed. No pertinent past medical history.    Physical Exam     ED Triage Vitals [11/17/24 1406]   /72   Pulse 74   Resp 17   Temp 98 °F (36.7 °C)   Temp src Temporal   SpO2 99 %   O2 Device None (Room air)     Current Vitals:   Vital Signs  BP: 118/54  Pulse: 56  Resp: 17  Temp: 98 °F (36.7 °C)  Temp src: Temporal  MAP (mmHg): 70    Physical Exam  Vitals and nursing note reviewed.     Musculoskeletal:      Cervical back: Neck supple.      Comments: Puncture wound to the sole of the left foot with tenderness, no significant surrounding erythema or drainage  Dorsum of left foot with tenderness, edema and erythema extending from forefoot streaking up to the ankle     Labs Reviewed   COMP METABOLIC PANEL (14) - Normal   CBC WITH DIFFERENTIAL WITH PLATELET   BLOOD CULTURE   BLOOD CULTURE     MDM      33-year-old male with no significant past medical history presents with left foot pain and swelling after he stepped on a nail 2 days ago.      Differential includes but is not limited to infected puncture wound, cellulitis, lymphangitis    Labs unremarkable  with normal WBC.    Discussed with ID Dr. London.  Recommends admission for IV antibiotics/Zosyn which will provide pseudomonal and anaerobic coverage.  Discussed with hospitalist Dr. Beasley.    Medical Decision Making  Amount and/or Complexity of Data Reviewed  Labs: ordered. Decision-making details documented in ED Course.  Discussion of management or test interpretation with external provider(s): ID, hospitalist    Disposition and Plan     Clinical Impression:  1. Infected puncture wound    2. Cellulitis of foot         History and Physical     Maxx Ray is a 33 year old male presents with L foot wound. Pt stepped on nail with his L foot 2 days ago. He got tetnus shot at [facility] as well. He has continued redness, swelling and pain to his L foot. He denies any F/C. No CP or SOB. No N/V/D/C or abdpain.      Physical Exam:    /54   Pulse 56   Temp 98 °F (36.7 °C) (Temporal)   Resp 17   Ht 177.8 cm (5' 10\")   Wt 235 lb (106.6 kg)   SpO2 100%   BMI 33.72 kg/m²   General: No acute distress, Alert  Respiratory: No rhonchi, no wheezes  Cardiovascular: S1, S2. Regular rate and rhythm  Abdomen: Soft, Non-tender, non-distended, positive bowel sounds  Neuro: No new focal deficits  Extremities: puncture wound to L foot with tenderness and erythema streaking to foot/ankle      Lab 11/17/24  1429   RBC 5.45   HGB 15.3   HCT 44.5   MCV 81.7   MCH 28.1   MCHC 34.4   RDW 12.7   NEPRELIM 4.75   WBC 8.0   .0      GLU 93   BUN 19   CREATSERUM 1.08   EGFRCR 92   CA 9.6   ALB 4.3      K 3.9      CO2 27.0   ALKPHO 66   AST 14   ALT 20   BILT 0.6   TP 7.5       Assessment & Plan:  #Infected Puncture Wound  #L foot cellulitis  Continue empiric abx, hopefully can transition to PO tmrw  Follow cultures  ID to evaluate      11/18:    ID:    Maxx Ray is a a(n) 33 year old male had stepped on a nail that was sticking through the bottom of an outdoor fence through his shoe on \"Thursday\" 11/14.  He  came to ED on 11/17 with redness at the site and was stared on zosyn overnight.  It is significantly improved. Denies pain or swelling.     Current Facility-Administered Medications:     piperacillin-tazobactam (Zosyn) 3.375 g in dextrose 5% 100 mL IVPB-ADDV, 3.375 g, Intravenous, Q8H    heparin (Porcine) 5000 UNIT/ML injection 5,000 Units, 5,000 Units, Subcutaneous, Q8H ELLIE    ketorolac (Toradol) 30 MG/ML injection 15 mg, 15 mg, Intravenous, Q6H PRN **OR** ketorolac (Toradol) 30 MG/ML injection 30 mg, 30 mg, Intravenous, Q6H PRN      General: No acute distress. Alert and oriented x 3.  Vital signs: Temp:  [97.6 °F (36.4 °C)-98.6 °F (37 °C)] 98.6 °F (37 °C)  Pulse:  [56-74] 56  Resp:  [17-20] 20  BP: (104-126)/(54-72) 104/55  SpO2:  [97 %-100 %] 98 %  Body mass index is 33.72 kg/m².  HEENT: Moist mucous membranes. Extraocular muscles are intact.  Neck: No swelling, no masses  Respiratory: Non labored, symmetric exursion  Cardiovascular: No irregularities in rhythm  Abdomen: Soft, nontender, nondistended.    Musculoskeletal: Full range of motion of all extremities.  No swelling noted.  Joints: no effusions. Puncture site noted, no drainage  Skin: No lesions. No erythema, no open wounds     Lab 11/18/24  0428   RBC 5.32   HGB 15.0   HCT 43.3   MCV 81.4   MCH 28.2   MCHC 34.6   RDW 12.6   NEPRELIM 2.90   WBC 7.5   .0        ASSESSMENT/PLAN:  1. Nail puncture wound left foot, 2nd MTP area on 11/14  Admitted 11/17 with cellulitis, much improved on zosyn  -can check xray for retained FB  Transition to PO levaquin for discharge           Vitals (last day)       Date/Time Temp Pulse Resp BP SpO2 Weight O2 Device O2 Flow Rate (L/min) [facility]    11/18/24 0750 98.6 °F (37 °C) 56 20 104/55 98 % -- None (Room air) -- AY    11/18/24 0330 97.6 °F (36.4 °C) 62 20 108/54 98 % -- None (Room air) -- RR    11/17/24 1905 98.1 °F (36.7 °C) 69 20 124/59 97 % -- None (Room air) -- RR    11/17/24 1631 -- -- -- -- -- 235 lb (106.6 kg) --  --     11/17/24 1545 -- 63 -- -- 99 % -- None (Room air) -- EW    11/17/24 1530 -- 59 -- 110/62 99 % -- None (Room air) -- EW    11/17/24 1500 -- 56 -- 118/54 100 % -- None (Room air) -- EW    11/17/24 1406 98 °F (36.7 °C) 74 17 126/72 99 % 235 lb (106.6 kg) None (Room air) -- DH

## 2024-11-18 NOTE — PROGRESS NOTES
[facility]   part of [facility]     Hospitalist Progress Note     Maxx Ray Patient Status:  Inpatient    12/15/1990 MRN [id number]   Location [facility] 3SW-A Attending Juan Beasley MD   Hosp Day # 1 PCP JANNETH SHERIFF MD     Chief Complaint: L foot puncture wound    Subjective:     Patient without acute events overnigth. Pain controlled. No F/C.     Objective:    Review of Systems:   A comprehensive review of systems was completed; pertinent positive and negatives stated in subjective.    Vital signs:  Temp:  [97.6 °F (36.4 °C)-98.6 °F (37 °C)] 98.6 °F (37 °C)  Pulse:  [56-74] 56  Resp:  [17-20] 20  BP: (104-126)/(54-72) 104/55  SpO2:  [97 %-100 %] 98 %    Physical Exam:    General: No acute distress  Respiratory: No wheezes, no rhonchi  Cardiovascular: S1, S2, regular rate and rhythm  Abdomen: Soft, Non-tender, non-distended, positive bowel sounds  Neuro: No new focal deficits.   Extremities: No edema, L foot puncture wound      Diagnostic Data:    Labs:  Recent Labs   Lab 24  1429 24  0428   WBC 8.0 7.5   HGB 15.3 15.0   MCV 81.7 81.4   .0 263.0       Recent Labs   Lab 24  1429   GLU 93   BUN 19   CREATSERUM 1.08   CA 9.6   ALB 4.3      K 3.9      CO2 27.0   ALKPHO 66   AST 14   ALT 20   BILT 0.6   TP 7.5       Estimated Creatinine Clearance: 99.5 mL/min (based on SCr of 1.08 mg/dL).    No results for input(s): \"TROP\", \"TROPHS\", \"CK\" in the last 168 hours.    No results for input(s): \"PTP\", \"INR\" in the last 168 hours.               Microbiology    No results found for this visit on 24.      Imaging: Reviewed in Epic.    Medications:    PARoxetine HCl  40 mg Oral QAM    piperacillin-tazobactam  3.375 g Intravenous Q8H    heparin  5,000 Units Subcutaneous Q8H ELLIE       Assessment & Plan:      #Infected Puncture Wound  #L foot cellulitis  Continue empiric abx, hopefully can transition to PO on DC  Follow cultures  ID to evaluate  Imaging  without foreign body        Juan Beasley MD    Supplementary Documentation:     Quality:  DVT Mechanical Prophylaxis:   SCDs,    DVT Pharmacologic Prophylaxis   Medication    heparin (Porcine) 5000 UNIT/ML injection 5,000 Units                Code Status: Not on file  Jeong: No urinary catheter in place  Jeong Duration (in days):   Central line:    CADEN: 11/18/2024    Discharge is dependent on: progress  At this point Mr. Ray is expected to be discharge to: home    The 21st Century Cures Act makes medical notes like these available to patients in the interest of transparency. Please be advised this is a medical document. Medical documents are intended to carry relevant information, facts as evident, and the clinical opinion of the practitioner. The medical note is intended as peer to peer communication and may appear blunt or direct. It is written in medical language and may contain abbreviations or verbiage that are unfamiliar.

## 2024-11-21 NOTE — PAYOR COMM NOTE
--------------  REQUEST FOR RECONSIDERATION    Note- this clinical was originally sent on 11/19      ADMISSION REVIEW     Payor: VICKEY MARTIN  Subscriber #:  [id number]  Authorization Number: [id number]    Admit date: 11/17/24  Admit time:  4:12 PM     Patient Seen in: [facility] Emergency Department    History     Stated Complaint: from urgent care for IV antibiotics    33-year-old male with no significant past medical history presents with left foot pain and swelling after he stepped on a nail 2 days ago.  Patient states the nail went through his work boot and punctured the bottom of his foot.  He states he went to [facility] got a tetanus shot but was not seen by provider.  He states pain has been gradually worsening over the last couple of days and went to immediate care and he was sent to ED for further evaluation after they noted red streaking on the dorsum of the foot.  He denies fever or chills.  Denies nausea or vomiting.    History reviewed. No pertinent past medical history.    Physical Exam     ED Triage Vitals [11/17/24 1406]   /72   Pulse 74   Resp 17   Temp 98 °F (36.7 °C)   Temp src Temporal   SpO2 99 %   O2 Device None (Room air)     Current Vitals:   Vital Signs  BP: 118/54  Pulse: 56  Resp: 17  Temp: 98 °F (36.7 °C)  Temp src: Temporal  MAP (mmHg): 70    Physical Exam  Vitals and nursing note reviewed.     Musculoskeletal:      Cervical back: Neck supple.      Comments: Puncture wound to the sole of the left foot with tenderness, no significant surrounding erythema or drainage  Dorsum of left foot with tenderness, edema and erythema extending from forefoot streaking up to the ankle     Labs Reviewed   COMP METABOLIC PANEL (14) - Normal   CBC WITH DIFFERENTIAL WITH PLATELET   BLOOD CULTURE   BLOOD CULTURE     MDM      33-year-old male with no significant past medical history presents with left foot pain and swelling after he stepped on a nail 2 days ago.      Differential includes but is  not limited to infected puncture wound, cellulitis, lymphangitis    Labs unremarkable with normal WBC.    Discussed with ID Dr. London.  Recommends admission for IV antibiotics/Zosyn which will provide pseudomonal and anaerobic coverage.  Discussed with hospitalist Dr. Beasley.    Medical Decision Making  Amount and/or Complexity of Data Reviewed  Labs: ordered. Decision-making details documented in ED Course.  Discussion of management or test interpretation with external provider(s): ID, hospitalist    Disposition and Plan     Clinical Impression:  1. Infected puncture wound    2. Cellulitis of foot         History and Physical     Maxx Ray is a 33 year old male presents with L foot wound. Pt stepped on nail with his L foot 2 days ago. He got tetnus shot at [facility] [employer] as well. He has continued redness, swelling and pain to his L foot. He denies any F/C. No CP or SOB. No N/V/D/C or abdpain.      Physical Exam:    /54   Pulse 56   Temp 98 °F (36.7 °C) (Temporal)   Resp 17   Ht 177.8 cm (5' 10\")   Wt 235 lb (106.6 kg)   SpO2 100%   BMI 33.72 kg/m²   General: No acute distress, Alert  Respiratory: No rhonchi, no wheezes  Cardiovascular: S1, S2. Regular rate and rhythm  Abdomen: Soft, Non-tender, non-distended, positive bowel sounds  Neuro: No new focal deficits  Extremities: puncture wound to L foot with tenderness and erythema streaking to foot/ankle      Lab 11/17/24  1429   RBC 5.45   HGB 15.3   HCT 44.5   MCV 81.7   MCH 28.1   MCHC 34.4   RDW 12.7   NEPRELIM 4.75   WBC 8.0   .0      GLU 93   BUN 19   CREATSERUM 1.08   EGFRCR 92   CA 9.6   ALB 4.3      K 3.9      CO2 27.0   ALKPHO 66   AST 14   ALT 20   BILT 0.6   TP 7.5       Assessment & Plan:  #Infected Puncture Wound  #L foot cellulitis  Continue empiric abx, hopefully can transition to PO tmrw  Follow cultures  ID to evaluate      11/18:    ID:    Maxx Ray is a a(n) 33 year old male had stepped on a nail that was  sticking through the bottom of an outdoor fence through his shoe on \"Thursday\" 11/14.  He came to ED on 11/17 with redness at the site and was stared on zosyn overnight.  It is significantly improved. Denies pain or swelling.     Current Facility-Administered Medications:     piperacillin-tazobactam (Zosyn) 3.375 g in dextrose 5% 100 mL IVPB-ADDV, 3.375 g, Intravenous, Q8H    heparin (Porcine) 5000 UNIT/ML injection 5,000 Units, 5,000 Units, Subcutaneous, Q8H ELLIE    ketorolac (Toradol) 30 MG/ML injection 15 mg, 15 mg, Intravenous, Q6H PRN **OR** ketorolac (Toradol) 30 MG/ML injection 30 mg, 30 mg, Intravenous, Q6H PRN      General: No acute distress. Alert and oriented x 3.  Vital signs: Temp:  [97.6 °F (36.4 °C)-98.6 °F (37 °C)] 98.6 °F (37 °C)  Pulse:  [56-74] 56  Resp:  [17-20] 20  BP: (104-126)/(54-72) 104/55  SpO2:  [97 %-100 %] 98 %  Body mass index is 33.72 kg/m².  HEENT: Moist mucous membranes. Extraocular muscles are intact.  Neck: No swelling, no masses  Respiratory: Non labored, symmetric exursion  Cardiovascular: No irregularities in rhythm  Abdomen: Soft, nontender, nondistended.    Musculoskeletal: Full range of motion of all extremities.  No swelling noted.  Joints: no effusions. Puncture site noted, no drainage  Skin: No lesions. No erythema, no open wounds     Lab 11/18/24  0428   RBC 5.32   HGB 15.0   HCT 43.3   MCV 81.4   MCH 28.2   MCHC 34.6   RDW 12.6   NEPRELIM 2.90   WBC 7.5   .0        ASSESSMENT/PLAN:  1. Nail puncture wound left foot, 2nd MTP area on 11/14  Admitted 11/17 with cellulitis, much improved on zosyn  -can check xray for retained FB  Transition to PO levaquin for discharge           Vitals (last day)       Date/Time Temp Pulse Resp BP SpO2 Weight O2 Device O2 Flow Rate (L/min) [facility]    11/18/24 0750 98.6 °F (37 °C) 56 20 104/55 98 % -- None (Room air) -- AY    11/18/24 0330 97.6 °F (36.4 °C) 62 20 108/54 98 % -- None (Room air) -- RR    11/17/24 1905 98.1 °F (36.7 °C) 69 20  124/59 97 % -- None (Room air) -- RR    11/17/24 1631 -- -- -- -- -- 235 lb (106.6 kg) -- -- JH    11/17/24 1545 -- 63 -- -- 99 % -- None (Room air) -- EW    11/17/24 1530 -- 59 -- 110/62 99 % -- None (Room air) -- EW    11/17/24 1500 -- 56 -- 118/54 100 % -- None (Room air) -- EW    11/17/24 1406 98 °F (36.7 °C) 74 17 126/72 99 % 235 lb (106.6 kg) None (Room air) -- DH

## 2025-05-13 ENCOUNTER — HOSPITAL ENCOUNTER (OUTPATIENT)
Age: 35
Discharge: HOME | End: 2025-05-13
Payer: COMMERCIAL

## 2025-05-13 ENCOUNTER — APPOINTMENT (OUTPATIENT)
Dept: GENERAL RADIOLOGY | Age: 35
End: 2025-05-13
Attending: PHYSICIAN ASSISTANT
Payer: COMMERCIAL

## 2025-05-13 VITALS
SYSTOLIC BLOOD PRESSURE: 142 MMHG | RESPIRATION RATE: 12 BRPM | TEMPERATURE: 98 F | OXYGEN SATURATION: 98 % | HEART RATE: 84 BPM | DIASTOLIC BLOOD PRESSURE: 77 MMHG

## 2025-05-13 DIAGNOSIS — R03.0 ELEVATED BLOOD PRESSURE READING: ICD-10-CM

## 2025-05-13 DIAGNOSIS — M79.644 PAIN OF RIGHT THUMB: Primary | ICD-10-CM

## 2025-05-13 PROCEDURE — 99213 OFFICE O/P EST LOW 20 MIN: CPT | Performed by: PHYSICIAN ASSISTANT

## 2025-05-13 PROCEDURE — 73140 X-RAY EXAM OF FINGER(S): CPT | Performed by: PHYSICIAN ASSISTANT

## 2025-05-13 PROCEDURE — L3924 HFO WITHOUT JOINTS PRE OTS: HCPCS | Performed by: PHYSICIAN ASSISTANT

## 2025-05-13 RX ORDER — IBUPROFEN 600 MG/1
TABLET, FILM COATED ORAL
Qty: 20 TABLET | Refills: 0 | Status: SHIPPED | OUTPATIENT
Start: 2025-05-13

## 2025-05-13 NOTE — ED INITIAL ASSESSMENT (HPI)
Taking out the trash last night, developed R thumb pain, heat, throbbing. Denies specific injury/trauma. Taking ibuprofen, last dose today 1200.

## 2025-05-13 NOTE — ED PROVIDER NOTES
Patient Seen in: [facility]    History     Chief Complaint   Patient presents with    Finger Injury     Stated Complaint: R- THUMB INJURY    HPI    HPI: Maxx Ray is a 34 year old male who presents with chief complaint of right thumb pain.  Onset yesterday.  Patient denies eliciting injury or trauma, but states he does perform repetitive tasks with right thumb at work.  Patient reports associated swelling of right thumb.  Patient denies other injury, head/neck injury or pain, open wound, decreased range of motion, ecchymosis, erythema, weakness, paraesthesias.      Past Medical History[1]    Past Surgical History[2]         Family History[3]    Short Social Hx on File[4]    Review of Systems    Positive for stated complaint: R- THUMB INJURY  Other systems are as noted in HPI.  Constitutional and vital signs reviewed.      All other systems reviewed and negative except as noted above.    PSFH elements reviewed from today and agreed except as otherwise stated in HPI.    Physical Exam     ED Triage Vitals [05/13/25 1656]   /77   Pulse 84   Resp 12   Temp 97.6 °F (36.4 °C)   Temp src Oral   SpO2 98 %   O2 Device None (Room air)       Current:/77   Pulse 84   Temp 97.6 °F (36.4 °C) (Oral)   Resp 12   SpO2 98%     PULSE OX within normal limits on room air as interpreted by this provider.    Physical Exam      Constitutional: The patient is cooperative. Appears well-developed and well-nourished.  Mild discomfort.  Psychological: Alert, No abnormalities of mood, affect.  Head: Normocephalic/atraumatic.  Eyes: Pupils are equal round reactive to light.  Conjunctiva are within normal limits.  ENT: Oropharynx is clear.  Neck: The neck is supple.  No meningeal signs.  Respiratory: Respiratory effort was normal.  There is no stridor.  Air entry is equal.  Cardiovascular: Regular rate and rhythm.  Capillary refill is brisk.   Genitourinary: Not examined.  Lymphatic: No gross lymphadenopathy  noted.  Musculoskeletal: Right upper extremity-Right upper extremity without acute bony deformity.  Positive swelling and tenderness to palpation present at distal aspect of right thumb.  Remainder of right upper extremity nontender to palpation.  Full range of motion of right thumb.  Distal pulses intact.  Capillary refill normal.  Motor intact distally.  Sensory intact distally.  No ecchymosis.  No erythema.  No open wounds.  No compartment syndrome.  No other edema.  Remainder of musculoskeletal system is grossly intact.  There is no obvious deformity.  Neurological: Gross motor movement is intact in all 4 extremities.  Patient exhibits normal speech.  Skin: Skin is normal to inspection, except as documented.  Warm and dry.  No obvious rash.        ED Course   Labs Reviewed - No data to display  Patient fitted and Velcro wrist splint with thumb spica applied to right upper extremity.  Distal neurovascular intact of right upper extremity following application.  MDM     Differential diagnosis prior to work-up including but not limited to fracture, strain/sprain, contusion, arthritis, tendinitis    Radiology findings:     XR FINGER(S) (MIN 2 VIEWS), RIGHT THUMB (CPT=73140) (Final result)  Result time 05/13/25 17:17:10  Final result by Aaron Mahoney MD (05/13/25 17:17:10)                Narrative:    PROCEDURE: XR FINGER(S) (MIN 2 VIEWS), RIGHT THUMB (CPT=73140)     COMPARISON: None.     INDICATIONS: Right 1st digit pain x 1 day. No known injury.     Findings and impression:  Normal alignment with no fracture  Finalized by (CST): Moises Mahoney MD on 5/13/2025 at 5:17 PM                  X-ray images of right thumb independently viewed by this provider-no acute fracture.    Physical exam remained stable as previously documented.  Available results reviewed with patient.    I have given the patient instructions regarding their diagnoses, expectations, follow up, and ER precautions. I explained to the patient  that emergent conditions may arise and to go to the ER for new, worsening or any persistent conditions. I've explained the importance of following up with their doctor as instructed. The patient verbalized understanding of the discharge instructions and plan.    The patient was informed of their elevated blood pressure reading at immediate care.  They were informed of the dangers of undiagnosed and untreated hypertension.  Education regarding lifestyle modifications and the need for appropriate follow-up with their PCP to have their blood pressure re-checked within 24-48 hours was provided.    Disposition and Plan     Clinical Impression:  1. Pain of right thumb    2. Elevated blood pressure reading        Disposition:  Discharge    Follow-up:  Wayne Ford MD  [address] [facility] [address]  [phone]    Call in 1 day  For follow-up    Porfirio Allan MD  [address] [facility] [address]  [phone]    Call in 1 day  For follow-up      Medications Prescribed:  Discharge Medication List as of 5/13/2025  5:26 PM        START taking these medications    Details   ibuprofen 600 MG Oral Tab Take 1 tablet (600 mg total) by mouth every 6 hours with food, Normal, Disp-20 tablet, R-0                            [1] History reviewed. No pertinent past medical history.  [2]   Past Surgical History:  Procedure Laterality Date    Tonsillectomy      2018   [3] History reviewed. No pertinent family history.  [4]   Social History  Socioeconomic History    Marital status:    Tobacco Use    Smoking status: Never    Smokeless tobacco: Never   Vaping Use    Vaping status: Never Used   Substance and Sexual Activity    Alcohol use: Yes     Comment: Occ.    Drug use: Never     Social Drivers of Health     Food Insecurity: No Food Insecurity (11/17/2024)    Food Insecurity     Food Insecurity: Never true   Transportation Needs: No Transportation Needs (11/17/2024)    Transportation Needs     Lack of  Transportation: No   Housing Stability: Low Risk  (11/17/2024)    Housing Stability     Housing Instability: No